# Patient Record
Sex: FEMALE | URBAN - METROPOLITAN AREA
[De-identification: names, ages, dates, MRNs, and addresses within clinical notes are randomized per-mention and may not be internally consistent; named-entity substitution may affect disease eponyms.]

---

## 2018-10-11 ENCOUNTER — APPOINTMENT (OUTPATIENT)
Age: 43
Setting detail: DERMATOLOGY
End: 2018-10-11

## 2018-10-11 DIAGNOSIS — Z41.9 ENCOUNTER FOR PROCEDURE FOR PURPOSES OTHER THAN REMEDYING HEALTH STATE, UNSPECIFIED: ICD-10-CM

## 2018-10-11 PROCEDURE — OTHER FILLERS: OTHER

## 2018-10-11 NOTE — PROCEDURE: FILLERS
Price (Use Numbers Only, No Special Characters Or $): 562 Price (Use Numbers Only, No Special Characters Or $): 937

## 2018-10-15 ENCOUNTER — APPOINTMENT (OUTPATIENT)
Age: 43
Setting detail: DERMATOLOGY
End: 2018-10-15

## 2018-10-15 DIAGNOSIS — L98.8 OTHER SPECIFIED DISORDERS OF THE SKIN AND SUBCUTANEOUS TISSUE: ICD-10-CM

## 2018-10-15 PROCEDURE — OTHER BOTOX: OTHER

## 2018-10-15 NOTE — PROCEDURE: FILLERS
Filler: Volbella A-T Advancement Flap Text: The defect edges were debeveled with a #15 scalpel blade.  Given the location of the defect, shape of the defect and the proximity to free margins an A-T advancement flap was deemed most appropriate.  Using a sterile surgical marker, an appropriate advancement flap was drawn incorporating the defect and placing the expected incisions within the relaxed skin tension lines where possible.    The area thus outlined was incised deep to adipose tissue with a #15 scalpel blade.  The skin margins were undermined to an appropriate distance in all directions utilizing iris scissors.

## 2019-02-13 ENCOUNTER — APPOINTMENT (OUTPATIENT)
Age: 44
Setting detail: DERMATOLOGY
End: 2019-02-13

## 2019-02-13 DIAGNOSIS — L98.8 OTHER SPECIFIED DISORDERS OF THE SKIN AND SUBCUTANEOUS TISSUE: ICD-10-CM

## 2019-02-13 PROCEDURE — OTHER BOTOX: OTHER

## 2019-02-14 ENCOUNTER — APPOINTMENT (OUTPATIENT)
Age: 44
Setting detail: DERMATOLOGY
End: 2019-02-14

## 2019-02-14 DIAGNOSIS — L90.8 OTHER ATROPHIC DISORDERS OF SKIN: ICD-10-CM

## 2019-02-14 PROCEDURE — OTHER RESTYLANE INJECTION: OTHER

## 2019-05-29 ENCOUNTER — APPOINTMENT (OUTPATIENT)
Age: 44
Setting detail: DERMATOLOGY
End: 2019-05-29

## 2019-05-29 DIAGNOSIS — Z41.9 ENCOUNTER FOR PROCEDURE FOR PURPOSES OTHER THAN REMEDYING HEALTH STATE, UNSPECIFIED: ICD-10-CM

## 2019-05-29 DIAGNOSIS — L98.8 OTHER SPECIFIED DISORDERS OF THE SKIN AND SUBCUTANEOUS TISSUE: ICD-10-CM

## 2019-05-29 PROCEDURE — OTHER FILLERS: OTHER

## 2019-05-29 PROCEDURE — OTHER BOTOX: OTHER

## 2019-09-30 ENCOUNTER — APPOINTMENT (OUTPATIENT)
Age: 44
Setting detail: DERMATOLOGY
End: 2019-09-30

## 2019-09-30 DIAGNOSIS — Z41.9 ENCOUNTER FOR PROCEDURE FOR PURPOSES OTHER THAN REMEDYING HEALTH STATE, UNSPECIFIED: ICD-10-CM

## 2019-09-30 DIAGNOSIS — L98.8 OTHER SPECIFIED DISORDERS OF THE SKIN AND SUBCUTANEOUS TISSUE: ICD-10-CM

## 2019-09-30 PROCEDURE — OTHER BOTOX: OTHER

## 2019-09-30 PROCEDURE — OTHER FILLERS: OTHER

## 2020-02-12 ENCOUNTER — APPOINTMENT (OUTPATIENT)
Age: 45
Setting detail: DERMATOLOGY
End: 2020-02-12

## 2020-02-12 DIAGNOSIS — Z41.9 ENCOUNTER FOR PROCEDURE FOR PURPOSES OTHER THAN REMEDYING HEALTH STATE, UNSPECIFIED: ICD-10-CM

## 2020-02-12 DIAGNOSIS — L98.8 OTHER SPECIFIED DISORDERS OF THE SKIN AND SUBCUTANEOUS TISSUE: ICD-10-CM

## 2020-02-12 PROCEDURE — OTHER FILLERS: OTHER

## 2020-02-12 PROCEDURE — OTHER BOTOX: OTHER

## 2020-06-22 ENCOUNTER — APPOINTMENT (OUTPATIENT)
Age: 45
Setting detail: DERMATOLOGY
End: 2020-08-16

## 2020-06-22 VITALS — TEMPERATURE: 97.4 F

## 2020-06-22 DIAGNOSIS — L98.8 OTHER SPECIFIED DISORDERS OF THE SKIN AND SUBCUTANEOUS TISSUE: ICD-10-CM

## 2020-06-22 PROCEDURE — OTHER BOTOX: OTHER

## 2020-09-30 ENCOUNTER — APPOINTMENT (OUTPATIENT)
Age: 45
Setting detail: DERMATOLOGY
End: 2020-10-02

## 2020-09-30 VITALS — TEMPERATURE: 98.6 F

## 2020-09-30 DIAGNOSIS — Z41.9 ENCOUNTER FOR PROCEDURE FOR PURPOSES OTHER THAN REMEDYING HEALTH STATE, UNSPECIFIED: ICD-10-CM

## 2020-09-30 DIAGNOSIS — L98.8 OTHER SPECIFIED DISORDERS OF THE SKIN AND SUBCUTANEOUS TISSUE: ICD-10-CM

## 2020-09-30 PROCEDURE — OTHER FILLERS: OTHER

## 2020-09-30 PROCEDURE — OTHER BOTOX: OTHER

## 2020-11-23 ENCOUNTER — APPOINTMENT (OUTPATIENT)
Age: 45
Setting detail: DERMATOLOGY
End: 2020-12-14

## 2020-11-23 VITALS — TEMPERATURE: 98.3 F

## 2020-11-23 DIAGNOSIS — L98.8 OTHER SPECIFIED DISORDERS OF THE SKIN AND SUBCUTANEOUS TISSUE: ICD-10-CM

## 2020-11-23 PROCEDURE — OTHER BOTOX: OTHER

## 2021-04-02 ENCOUNTER — APPOINTMENT (OUTPATIENT)
Age: 46
Setting detail: DERMATOLOGY
End: 2021-08-23

## 2021-04-02 VITALS — TEMPERATURE: 97.8 F

## 2021-04-02 DIAGNOSIS — L98.8 OTHER SPECIFIED DISORDERS OF THE SKIN AND SUBCUTANEOUS TISSUE: ICD-10-CM

## 2021-04-02 PROCEDURE — OTHER BOTOX: OTHER

## 2021-04-02 NOTE — PROCEDURE: BOTOX
Elizabeth requesting UA results from 8/26/20. And also what is the follow up from these results. Please call Elizabeth at [phone]   Detail Level: Detailed

## 2021-04-23 ENCOUNTER — APPOINTMENT (OUTPATIENT)
Age: 46
Setting detail: DERMATOLOGY
End: 2021-08-23

## 2021-04-23 VITALS — TEMPERATURE: 97.7 F

## 2021-04-23 DIAGNOSIS — Z41.9 ENCOUNTER FOR PROCEDURE FOR PURPOSES OTHER THAN REMEDYING HEALTH STATE, UNSPECIFIED: ICD-10-CM

## 2021-04-23 PROCEDURE — OTHER ADDITIONAL NOTES: OTHER

## 2021-04-23 NOTE — PROCEDURE: ADDITIONAL NOTES
Render Risk Assessment In Note?: no
Additional Notes: Bump on lip not related to past \\nIp filler. Also seen by Dr William.
Detail Level: Simple

## 2023-07-14 ENCOUNTER — APPOINTMENT (OUTPATIENT)
Dept: URBAN - METROPOLITAN AREA CLINIC 298 | Age: 48
Setting detail: DERMATOLOGY
End: 2023-07-24

## 2023-07-14 DIAGNOSIS — L98.8 OTHER SPECIFIED DISORDERS OF THE SKIN AND SUBCUTANEOUS TISSUE: ICD-10-CM

## 2023-07-14 PROCEDURE — OTHER BOTOX: OTHER

## 2023-07-14 NOTE — PROCEDURE: BOTOX
Levator Labii Superioris Units: 0
Show Additional Area 3: Yes
Additional Area 1 Location: upper lip
Show Right And Left Brow Units: No
Incrementing Botox Units: By 0.5 Units
Post-Care Instructions: Patient instructed to not lie down for 4 hours and limit physical activity for 24 hours.
Detail Level: Detailed
Dilution (U/0.1 Cc): 4
Inferior Lateral Orbicularis Oculi Units: 20
Forehead Units: 3
Show Inventory Tab: Show
Consent: Written consent obtained. Risks include but not limited to lid/brow ptosis, bruising, swelling, diplopia, temporary effect, incomplete chemical denervation.

## 2023-10-20 ENCOUNTER — APPOINTMENT (OUTPATIENT)
Dept: URBAN - METROPOLITAN AREA CLINIC 298 | Age: 48
Setting detail: DERMATOLOGY
End: 2023-10-27

## 2023-10-20 DIAGNOSIS — L98.8 OTHER SPECIFIED DISORDERS OF THE SKIN AND SUBCUTANEOUS TISSUE: ICD-10-CM

## 2023-10-20 PROCEDURE — OTHER BOTOX: OTHER

## 2023-10-20 NOTE — PROCEDURE: BOTOX
Lateral Platysmal Bands Units: 0
Show Masseter Units: Yes
Additional Area 1 Location: upper lip
Inferior Lateral Orbicularis Oculi Units: 20
Show Right And Left Periorbital Units: No
Detail Level: Detailed
Incrementing Botox Units: By 0.5 Units
Show Inventory Tab: Show
Post-Care Instructions: Patient instructed to not lie down for 4 hours and limit physical activity for 24 hours.
Forehead Units: 3
Dilution (U/0.1 Cc): 4
Consent: Written consent obtained. Risks include but not limited to lid/brow ptosis, bruising, swelling, diplopia, temporary effect, incomplete chemical denervation.

## 2024-03-08 ENCOUNTER — APPOINTMENT (OUTPATIENT)
Dept: URBAN - METROPOLITAN AREA CLINIC 298 | Age: 49
Setting detail: DERMATOLOGY
End: 2024-03-12

## 2024-03-08 DIAGNOSIS — L98.8 OTHER SPECIFIED DISORDERS OF THE SKIN AND SUBCUTANEOUS TISSUE: ICD-10-CM

## 2024-03-08 PROCEDURE — OTHER BOTOX: OTHER

## 2024-03-08 NOTE — PROCEDURE: BOTOX
Nasal Root Units: 0
Show Topical Anesthesia: Yes
Glabellar Complex Units: 20
Show Mentalis Units: No
Detail Level: Detailed
Additional Area 1 Location: chin
Incrementing Botox Units: By 0.5 Units
Dilution (U/0.1 Cc): 4
Show Inventory Tab: Show
Forehead Units: 3
Consent: Written consent obtained. Risks include but not limited to lid/brow ptosis, bruising, swelling, diplopia, temporary effect, incomplete chemical denervation.
Post-Care Instructions: Patient instructed to not lie down for 4 hours and limit physical activity for 24 hours.
Additional Area 2 Location: upper lip

## 2024-10-24 ENCOUNTER — APPOINTMENT (OUTPATIENT)
Dept: URBAN - METROPOLITAN AREA CLINIC 298 | Age: 49
Setting detail: DERMATOLOGY
End: 2024-10-24

## 2024-10-24 DIAGNOSIS — L98.8 OTHER SPECIFIED DISORDERS OF THE SKIN AND SUBCUTANEOUS TISSUE: ICD-10-CM

## 2024-10-24 PROCEDURE — OTHER BOTOX: OTHER

## 2024-10-24 NOTE — PROCEDURE: BOTOX
Mentalis Units: 0
Show Periorbital Units: Yes
Incrementing Botox Units: By 0.5 Units
Show Lcl Units: No
Additional Area 1 Location: chin
stretcher
Show Inventory Tab: Show
Dilution (U/0.1 Cc): 4
Forehead Units: 3
Glabellar Complex Units: 24
Consent: Written consent obtained. Risks include but not limited to lid/brow ptosis, bruising, swelling, diplopia, temporary effect, incomplete chemical denervation.
Detail Level: Detailed
Post-Care Instructions: Patient instructed to not lie down for 4 hours and limit physical activity for 24 hours.